# Patient Record
Sex: MALE | Race: WHITE | NOT HISPANIC OR LATINO | Employment: FULL TIME | URBAN - METROPOLITAN AREA
[De-identification: names, ages, dates, MRNs, and addresses within clinical notes are randomized per-mention and may not be internally consistent; named-entity substitution may affect disease eponyms.]

---

## 2018-06-15 ENCOUNTER — OFFICE VISIT (OUTPATIENT)
Dept: FAMILY MEDICINE | Facility: CLINIC | Age: 23
End: 2018-06-15
Payer: COMMERCIAL

## 2018-06-15 VITALS
BODY MASS INDEX: 21.42 KG/M2 | RESPIRATION RATE: 18 BRPM | HEIGHT: 69 IN | DIASTOLIC BLOOD PRESSURE: 72 MMHG | WEIGHT: 144.63 LBS | SYSTOLIC BLOOD PRESSURE: 109 MMHG | TEMPERATURE: 97 F

## 2018-06-15 DIAGNOSIS — Z20.2 EXPOSURE TO STD: Primary | ICD-10-CM

## 2018-06-15 PROCEDURE — 99999 PR PBB SHADOW E&M-NEW PATIENT-LVL III: CPT | Mod: PBBFAC,,, | Performed by: FAMILY MEDICINE

## 2018-06-15 PROCEDURE — 3008F BODY MASS INDEX DOCD: CPT | Mod: CPTII,S$GLB,, | Performed by: FAMILY MEDICINE

## 2018-06-15 PROCEDURE — 99201 PR OFFICE/OUTPT VISIT,NEW,LEVL I: CPT | Mod: S$GLB,,, | Performed by: FAMILY MEDICINE

## 2018-06-15 NOTE — PROGRESS NOTES
Subjective:       Patient ID: Samy Hollis is a 23 y.o. male.    Chief Complaint: STD CHECK      HPI  Mr. Hollis presents to clinic today for std check.   He states he has had some one night stands and has never had this checked.   He denies any fever, cough, chest pain, dysuria, penile discharge.   He reports he has a small bump that comes on goes on his shaft.   He in currently in school studying to be a .     Review of Systems   Constitutional: Negative for fever and unexpected weight change.   Respiratory: Negative for cough and shortness of breath.    Cardiovascular: Negative for chest pain.   Gastrointestinal: Negative for abdominal pain, diarrhea and vomiting.   Genitourinary: Negative for discharge, dysuria, hematuria, scrotal swelling and testicular pain.   Neurological: Negative for dizziness and headaches.           There is no problem list on file for this patient.      No family history on file.  Social History     Social History    Marital status: Single     Spouse name: N/A    Number of children: N/A    Years of education: N/A     Occupational History    Not on file.     Social History Main Topics    Smoking status: Current Some Day Smoker     Types: Cigarettes    Smokeless tobacco: Never Used    Alcohol use Yes    Drug use: No    Sexual activity: Yes     Partners: Female     Other Topics Concern    Not on file     Social History Narrative    No narrative on file     No past medical history on file.  No past surgical history on file.    There are no preventive care reminders to display for this patient.    Objective:     Physical Exam   Constitutional: He is oriented to person, place, and time. He appears well-developed and well-nourished. No distress.   HENT:   Head: Normocephalic and atraumatic.   Eyes: EOM are normal. Right eye exhibits no discharge. Left eye exhibits no discharge.   Cardiovascular: Normal rate and regular rhythm.    Pulmonary/Chest: Effort normal  and breath sounds normal. No respiratory distress. He has no wheezes.   Genitourinary:   Genitourinary Comments: Small skin colored raised area on shaft , not ulcerated and does not look like herpetic infection      Musculoskeletal: He exhibits no edema.   Neurological: He is alert and oriented to person, place, and time.   Skin: Skin is warm and dry. He is not diaphoretic. No erythema.   Psychiatric: He has a normal mood and affect.   Vitals reviewed.    Vitals:    06/15/18 1112   BP: 109/72   Resp: 18   Temp: 97.3 °F (36.3 °C)       Assessment/  PLAN     Exposure to STD  -     C. trachomatis/N. gonorrhoeae by AMP DNA Urine  -     HIV-1 and HIV-2 antibodies; Future; Expected date: 07/15/2018  -     RPR; Future; Expected date: 06/29/2018  -     Herpes simplex type 1 & 2 IgM,Herpes IgM; Future; Expected date: 06/29/2018        Nica Craig MD  [facility]

## 2018-06-21 LAB
C TRACH RRNA SPEC QL NAA+PROBE: NEGATIVE
HIV 1+2 AB+HIV1 P24 AG SERPL QL IA: NON REACTIVE
HSV1+2 IGM SER IA-ACNC: 1.57 RATIO (ref 0–0.9)
RPR SER QL: NON REACTIVE

## 2018-06-22 ENCOUNTER — TELEPHONE (OUTPATIENT)
Dept: FAMILY MEDICINE | Facility: CLINIC | Age: 23
End: 2018-06-22

## 2018-06-22 NOTE — TELEPHONE ENCOUNTER
----- Message from Nica Craig MD sent at 6/21/2018  8:17 PM CDT -----  Please let pt know that his syphilis , hiv , chlamydia  Test are negative   His test for herpes was positive.   I am not sure if it is herpes 1 or 2 , because [facility] testing is different than ours.   He can follow up in clinic for further discussion.   If he has oral ulcer or genital - I will send in medicine.

## 2018-06-25 ENCOUNTER — TELEPHONE (OUTPATIENT)
Dept: FAMILY MEDICINE | Facility: CLINIC | Age: 23
End: 2018-06-25

## 2018-06-26 ENCOUNTER — OFFICE VISIT (OUTPATIENT)
Dept: FAMILY MEDICINE | Facility: CLINIC | Age: 23
End: 2018-06-26
Payer: COMMERCIAL

## 2018-06-26 ENCOUNTER — LAB VISIT (OUTPATIENT)
Dept: LAB | Facility: HOSPITAL | Age: 23
End: 2018-06-26
Attending: FAMILY MEDICINE
Payer: COMMERCIAL

## 2018-06-26 VITALS
TEMPERATURE: 97 F | DIASTOLIC BLOOD PRESSURE: 62 MMHG | WEIGHT: 140.19 LBS | OXYGEN SATURATION: 98 % | HEIGHT: 69 IN | BODY MASS INDEX: 20.76 KG/M2 | HEART RATE: 73 BPM | SYSTOLIC BLOOD PRESSURE: 118 MMHG

## 2018-06-26 DIAGNOSIS — Z86.19 HISTORY OF HERPES SIMPLEX INFECTION: Primary | ICD-10-CM

## 2018-06-26 DIAGNOSIS — Z86.19 HISTORY OF HERPES SIMPLEX INFECTION: ICD-10-CM

## 2018-06-26 PROCEDURE — 3008F BODY MASS INDEX DOCD: CPT | Mod: CPTII,S$GLB,, | Performed by: FAMILY MEDICINE

## 2018-06-26 PROCEDURE — 99214 OFFICE O/P EST MOD 30 MIN: CPT | Mod: S$GLB,,, | Performed by: FAMILY MEDICINE

## 2018-06-26 PROCEDURE — 99999 PR PBB SHADOW E&M-EST. PATIENT-LVL III: CPT | Mod: PBBFAC,,, | Performed by: FAMILY MEDICINE

## 2018-06-26 PROCEDURE — 36415 COLL VENOUS BLD VENIPUNCTURE: CPT | Mod: PO

## 2018-06-26 PROCEDURE — 86694 HERPES SIMPLEX NES ANTBDY: CPT

## 2018-06-26 NOTE — TELEPHONE ENCOUNTER
We can only order hsv 1 and 2 as one order.   He can get it done here.   Or if he has a ulcer that he would like to get looked at, I can see him or he can get on mychart and send a picture.

## 2018-06-26 NOTE — PROGRESS NOTES
Subjective:       Patient ID: Samy Hollis is a 23 y.o. male.    Chief Complaint: Follow-up (need lab work)      HPI    Mr. Hollis presents to clinic today for follow up.   [facility] for herpes simplex 1 and 2.   The lab was not clear which one it was.  He is here for further follow up.   He has an area on his lip that he states is chapped lips.   He denies any genital ulcer.   He denies any recent fever.   He is sexually active.     Review of Systems   Constitutional: Negative for fever.   Respiratory: Negative for cough and shortness of breath.    Cardiovascular: Negative for chest pain.   Gastrointestinal: Negative for abdominal pain, blood in stool and diarrhea.   Genitourinary: Negative for difficulty urinating and hematuria.   Skin: Negative for rash.   Neurological: Negative for dizziness and headaches.           There is no problem list on file for this patient.        Objective:     Physical Exam   Constitutional: He is oriented to person, place, and time. He appears well-developed and well-nourished. No distress.   HENT:   Head: Normocephalic and atraumatic.   Right Ear: External ear normal.   Left Ear: External ear normal.   Possible cold sore on lip vs chapped lip     Eyes: EOM are normal. Right eye exhibits no discharge. Left eye exhibits no discharge.   Cardiovascular: Normal rate and regular rhythm.    Pulmonary/Chest: Effort normal and breath sounds normal. No respiratory distress. He has no wheezes.   Musculoskeletal: He exhibits no edema.   Neurological: He is alert and oriented to person, place, and time.   Skin: Skin is warm and dry. He is not diaphoretic. No erythema.   Psychiatric: He has a normal mood and affect.   Vitals reviewed.    Vitals:    06/26/18 1407   BP: 118/62   Pulse: 73   Temp: 97.4 °F (36.3 °C)       Assessment/  PLAN     History of herpes simplex infection  -     HERPES SIMPLEX 1 & 2 IGM; Future; Expected date: 07/10/2018        will follow up above  And proceed    Nica Craig MD  [facility]

## 2018-06-28 ENCOUNTER — TELEPHONE (OUTPATIENT)
Dept: FAMILY MEDICINE | Facility: CLINIC | Age: 23
End: 2018-06-28

## 2018-06-28 LAB — HSV AB, IGM BY EIA: NEGATIVE

## 2018-06-28 NOTE — TELEPHONE ENCOUNTER
----- Message from Tea Ferguson sent at 6/28/2018  4:54 PM CDT -----  Contact: Patient  Patient called for lab results. He can be contacted at [phone].    Thanks,  Tea

## 2018-06-28 NOTE — TELEPHONE ENCOUNTER
Spoke with pt and informed him that his results have not came in yet and we will contact him when they do with understanding.

## 2018-06-29 ENCOUNTER — TELEPHONE (OUTPATIENT)
Dept: FAMILY MEDICINE | Facility: CLINIC | Age: 23
End: 2018-06-29

## 2018-06-29 NOTE — TELEPHONE ENCOUNTER
----- Message from Denise Gonzalez sent at 6/29/2018  4:39 PM CDT -----  Contact: self/[phone]  Returning call, please call back at [phone]. Thanks/ar

## 2018-06-29 NOTE — TELEPHONE ENCOUNTER
Called pt informed him of results and of MD recommendations.  Informed pt results letter will mailed to him.  Pt voiced understanding.

## 2020-02-16 ENCOUNTER — NURSE TRIAGE (OUTPATIENT)
Dept: ADMINISTRATIVE | Facility: CLINIC | Age: 25
End: 2020-02-16

## 2020-02-16 NOTE — TELEPHONE ENCOUNTER
Spoke with pt:    Kissed someone with a cold sore and wondering if there is anything to prevent catching it. Pt has no actual sore.protocl instructions given and pt verbalizes understanding.     Reason for Disposition   Herpes Simplex (Genital), questions about   Cold sores without complications    Additional Information   Negative: Patient sounds very sick or weak to the triager   Negative: Sores on the eye, eyelids or tip of nose   Negative: Red streak or red area spreading from the cold sore   Negative: Weak immune system (e.g., HIV positive, cancer chemo, splenectomy, organ transplant, chronic steroids)   Negative: New sores occur in another area   Negative: Sores last > 2 weeks   Negative: Patient wants to be seen   Negative: Herpes sores are a recurrent problem, and caller wants a prescription medicine to take the next time they occur    Protocols used: ST STD [id number], COLD SORES - FEVER BLISTERS OF LIP-A-OH

## 2021-04-28 ENCOUNTER — PATIENT MESSAGE (OUTPATIENT)
Dept: RESEARCH | Facility: HOSPITAL | Age: 26
End: 2021-04-28